# Patient Record
Sex: MALE | Race: BLACK OR AFRICAN AMERICAN | Employment: OTHER | URBAN - NONMETROPOLITAN AREA
[De-identification: names, ages, dates, MRNs, and addresses within clinical notes are randomized per-mention and may not be internally consistent; named-entity substitution may affect disease eponyms.]

---

## 2022-05-19 ENCOUNTER — HOSPITAL ENCOUNTER (INPATIENT)
Age: 67
LOS: 2 days | Discharge: HOME | DRG: 440 | End: 2022-05-21
Attending: INTERNAL MEDICINE | Admitting: HOSPITALIST
Payer: MEDICARE

## 2022-05-19 ENCOUNTER — APPOINTMENT (OUTPATIENT)
Dept: ULTRASOUND IMAGING | Age: 67
DRG: 440 | End: 2022-05-19
Attending: INTERNAL MEDICINE
Payer: MEDICARE

## 2022-05-19 PROBLEM — K85.90 ACUTE PANCREATITIS WITHOUT INFECTION OR NECROSIS: Status: ACTIVE | Noted: 2022-05-19

## 2022-05-19 LAB
ALBUMIN SERPL-MCNC: 4.4 G/DL (ref 3.5–5.2)
ALP BLD-CCNC: 81 U/L (ref 40–130)
ALT SERPL-CCNC: 15 U/L (ref 5–41)
ANION GAP SERPL CALCULATED.3IONS-SCNC: 11 MMOL/L (ref 7–19)
ANION GAP SERPL CALCULATED.3IONS-SCNC: 11 MMOL/L (ref 7–19)
AST SERPL-CCNC: 12 U/L (ref 5–40)
BASOPHILS ABSOLUTE: 0 K/UL (ref 0–0.2)
BASOPHILS RELATIVE PERCENT: 0.4 % (ref 0–1)
BILIRUB SERPL-MCNC: 0.3 MG/DL (ref 0.2–1.2)
BUN BLDV-MCNC: 11 MG/DL (ref 8–23)
BUN BLDV-MCNC: 12 MG/DL (ref 8–23)
CALCIUM SERPL-MCNC: 9.7 MG/DL (ref 8.8–10.2)
CALCIUM SERPL-MCNC: 9.8 MG/DL (ref 8.8–10.2)
CHLORIDE BLD-SCNC: 101 MMOL/L (ref 98–111)
CHLORIDE BLD-SCNC: 102 MMOL/L (ref 98–111)
CO2: 26 MMOL/L (ref 22–29)
CO2: 26 MMOL/L (ref 22–29)
CREAT SERPL-MCNC: 1 MG/DL (ref 0.5–1.2)
CREAT SERPL-MCNC: 1 MG/DL (ref 0.5–1.2)
EOSINOPHILS ABSOLUTE: 0.1 K/UL (ref 0–0.6)
EOSINOPHILS RELATIVE PERCENT: 0.7 % (ref 0–5)
GFR AFRICAN AMERICAN: >59
GFR AFRICAN AMERICAN: >59
GFR NON-AFRICAN AMERICAN: >60
GFR NON-AFRICAN AMERICAN: >60
GLUCOSE BLD-MCNC: 105 MG/DL (ref 74–109)
GLUCOSE BLD-MCNC: 107 MG/DL (ref 74–109)
HCT VFR BLD CALC: 46.7 % (ref 42–52)
HEMOGLOBIN: 15 G/DL (ref 14–18)
IMMATURE GRANULOCYTES #: 0 K/UL
LACTIC ACID: 1.5 MMOL/L (ref 0.5–1.9)
LIPASE: 136 U/L (ref 13–60)
LYMPHOCYTES ABSOLUTE: 2 K/UL (ref 1.1–4.5)
LYMPHOCYTES RELATIVE PERCENT: 24.7 % (ref 20–40)
MCH RBC QN AUTO: 31.7 PG (ref 27–31)
MCHC RBC AUTO-ENTMCNC: 32.1 G/DL (ref 33–37)
MCV RBC AUTO: 98.7 FL (ref 80–94)
MONOCYTES ABSOLUTE: 0.9 K/UL (ref 0–0.9)
MONOCYTES RELATIVE PERCENT: 10.8 % (ref 0–10)
NEUTROPHILS ABSOLUTE: 5.1 K/UL (ref 1.5–7.5)
NEUTROPHILS RELATIVE PERCENT: 63.2 % (ref 50–65)
PDW BLD-RTO: 12.6 % (ref 11.5–14.5)
PLATELET # BLD: 211 K/UL (ref 130–400)
PMV BLD AUTO: 10.8 FL (ref 9.4–12.4)
POTASSIUM REFLEX MAGNESIUM: 4.3 MMOL/L (ref 3.5–5)
POTASSIUM SERPL-SCNC: 4.2 MMOL/L (ref 3.5–5)
RBC # BLD: 4.73 M/UL (ref 4.7–6.1)
SODIUM BLD-SCNC: 138 MMOL/L (ref 136–145)
SODIUM BLD-SCNC: 139 MMOL/L (ref 136–145)
TOTAL PROTEIN: 7.1 G/DL (ref 6.6–8.7)
WBC # BLD: 8.1 K/UL (ref 4.8–10.8)

## 2022-05-19 PROCEDURE — 80053 COMPREHEN METABOLIC PANEL: CPT

## 2022-05-19 PROCEDURE — 85025 COMPLETE CBC W/AUTO DIFF WBC: CPT

## 2022-05-19 PROCEDURE — 83690 ASSAY OF LIPASE: CPT

## 2022-05-19 PROCEDURE — 83605 ASSAY OF LACTIC ACID: CPT

## 2022-05-19 PROCEDURE — 1210000000 HC MED SURG R&B

## 2022-05-19 PROCEDURE — 99221 1ST HOSP IP/OBS SF/LOW 40: CPT | Performed by: SPECIALIST

## 2022-05-19 PROCEDURE — 6370000000 HC RX 637 (ALT 250 FOR IP): Performed by: NURSE PRACTITIONER

## 2022-05-19 PROCEDURE — 2580000003 HC RX 258: Performed by: NURSE PRACTITIONER

## 2022-05-19 PROCEDURE — 6360000002 HC RX W HCPCS: Performed by: NURSE PRACTITIONER

## 2022-05-19 PROCEDURE — 76705 ECHO EXAM OF ABDOMEN: CPT

## 2022-05-19 PROCEDURE — 36415 COLL VENOUS BLD VENIPUNCTURE: CPT

## 2022-05-19 RX ORDER — METOPROLOL SUCCINATE 25 MG/1
25 TABLET, EXTENDED RELEASE ORAL DAILY
Status: DISCONTINUED | OUTPATIENT
Start: 2022-05-19 | End: 2022-05-21 | Stop reason: HOSPADM

## 2022-05-19 RX ORDER — ONDANSETRON 4 MG/1
4 TABLET, ORALLY DISINTEGRATING ORAL EVERY 8 HOURS PRN
Status: DISCONTINUED | OUTPATIENT
Start: 2022-05-19 | End: 2022-05-21 | Stop reason: HOSPADM

## 2022-05-19 RX ORDER — MORPHINE SULFATE 4 MG/ML
4 INJECTION, SOLUTION INTRAMUSCULAR; INTRAVENOUS
Status: DISCONTINUED | OUTPATIENT
Start: 2022-05-19 | End: 2022-05-21 | Stop reason: HOSPADM

## 2022-05-19 RX ORDER — BUPROPION HYDROCHLORIDE 150 MG/1
150 TABLET ORAL EVERY MORNING
COMMUNITY

## 2022-05-19 RX ORDER — SODIUM CHLORIDE 0.9 % (FLUSH) 0.9 %
5-40 SYRINGE (ML) INJECTION EVERY 12 HOURS SCHEDULED
Status: DISCONTINUED | OUTPATIENT
Start: 2022-05-19 | End: 2022-05-21 | Stop reason: HOSPADM

## 2022-05-19 RX ORDER — ROSUVASTATIN CALCIUM 10 MG/1
10 TABLET, COATED ORAL DAILY
Status: DISCONTINUED | OUTPATIENT
Start: 2022-05-19 | End: 2022-05-21 | Stop reason: HOSPADM

## 2022-05-19 RX ORDER — M-VIT,TX,IRON,MINS/CALC/FOLIC 27MG-0.4MG
1 TABLET ORAL DAILY
Status: DISCONTINUED | OUTPATIENT
Start: 2022-05-19 | End: 2022-05-21 | Stop reason: HOSPADM

## 2022-05-19 RX ORDER — BUPROPION HYDROCHLORIDE 150 MG/1
150 TABLET ORAL EVERY MORNING
Status: DISCONTINUED | OUTPATIENT
Start: 2022-05-20 | End: 2022-05-21 | Stop reason: HOSPADM

## 2022-05-19 RX ORDER — SIMVASTATIN 80 MG
80 TABLET ORAL NIGHTLY
COMMUNITY

## 2022-05-19 RX ORDER — ZOLPIDEM TARTRATE 10 MG/1
12.5 TABLET ORAL NIGHTLY PRN
COMMUNITY

## 2022-05-19 RX ORDER — HYDROCODONE BITARTRATE AND ACETAMINOPHEN 10; 325 MG/1; MG/1
1 TABLET ORAL EVERY 6 HOURS PRN
COMMUNITY

## 2022-05-19 RX ORDER — OMEPRAZOLE 20 MG/1
20 CAPSULE, DELAYED RELEASE ORAL DAILY
Status: ON HOLD | COMMUNITY
End: 2022-05-21 | Stop reason: HOSPADM

## 2022-05-19 RX ORDER — LISINOPRIL AND HYDROCHLOROTHIAZIDE 12.5; 1 MG/1; MG/1
1 TABLET ORAL DAILY
COMMUNITY

## 2022-05-19 RX ORDER — M-VIT,TX,IRON,MINS/CALC/FOLIC 27MG-0.4MG
1 TABLET ORAL DAILY
COMMUNITY

## 2022-05-19 RX ORDER — PANTOPRAZOLE SODIUM 40 MG/1
40 TABLET, DELAYED RELEASE ORAL DAILY
COMMUNITY

## 2022-05-19 RX ORDER — SODIUM CHLORIDE 9 MG/ML
INJECTION, SOLUTION INTRAVENOUS PRN
Status: DISCONTINUED | OUTPATIENT
Start: 2022-05-19 | End: 2022-05-21 | Stop reason: HOSPADM

## 2022-05-19 RX ORDER — SODIUM CHLORIDE 9 MG/ML
INJECTION, SOLUTION INTRAVENOUS CONTINUOUS
Status: DISCONTINUED | OUTPATIENT
Start: 2022-05-19 | End: 2022-05-21 | Stop reason: HOSPADM

## 2022-05-19 RX ORDER — SUCRALFATE 1 G/1
1 TABLET ORAL 4 TIMES DAILY
COMMUNITY

## 2022-05-19 RX ORDER — MORPHINE SULFATE 2 MG/ML
2 INJECTION, SOLUTION INTRAMUSCULAR; INTRAVENOUS
Status: DISCONTINUED | OUTPATIENT
Start: 2022-05-19 | End: 2022-05-21 | Stop reason: HOSPADM

## 2022-05-19 RX ORDER — ASPIRIN 81 MG/1
81 TABLET ORAL DAILY
Status: DISCONTINUED | OUTPATIENT
Start: 2022-05-19 | End: 2022-05-21 | Stop reason: HOSPADM

## 2022-05-19 RX ORDER — METOPROLOL SUCCINATE 25 MG/1
25 TABLET, EXTENDED RELEASE ORAL DAILY
COMMUNITY

## 2022-05-19 RX ORDER — HYDROCODONE BITARTRATE AND ACETAMINOPHEN 10; 325 MG/1; MG/1
1 TABLET ORAL EVERY 6 HOURS PRN
Status: DISCONTINUED | OUTPATIENT
Start: 2022-05-19 | End: 2022-05-21 | Stop reason: HOSPADM

## 2022-05-19 RX ORDER — ONDANSETRON 2 MG/ML
4 INJECTION INTRAMUSCULAR; INTRAVENOUS EVERY 6 HOURS PRN
Status: DISCONTINUED | OUTPATIENT
Start: 2022-05-19 | End: 2022-05-21 | Stop reason: HOSPADM

## 2022-05-19 RX ORDER — LORAZEPAM 0.5 MG/1
0.5 TABLET ORAL NIGHTLY
Status: DISCONTINUED | OUTPATIENT
Start: 2022-05-19 | End: 2022-05-21 | Stop reason: HOSPADM

## 2022-05-19 RX ORDER — SODIUM CHLORIDE 0.9 % (FLUSH) 0.9 %
5-40 SYRINGE (ML) INJECTION PRN
Status: DISCONTINUED | OUTPATIENT
Start: 2022-05-19 | End: 2022-05-21 | Stop reason: HOSPADM

## 2022-05-19 RX ORDER — SILDENAFIL 100 MG/1
100 TABLET, FILM COATED ORAL PRN
COMMUNITY

## 2022-05-19 RX ORDER — PANTOPRAZOLE SODIUM 40 MG/1
40 TABLET, DELAYED RELEASE ORAL
Status: DISCONTINUED | OUTPATIENT
Start: 2022-05-20 | End: 2022-05-21 | Stop reason: HOSPADM

## 2022-05-19 RX ORDER — ENOXAPARIN SODIUM 100 MG/ML
40 INJECTION SUBCUTANEOUS DAILY
Status: DISCONTINUED | OUTPATIENT
Start: 2022-05-19 | End: 2022-05-21 | Stop reason: HOSPADM

## 2022-05-19 RX ADMIN — MORPHINE SULFATE 4 MG: 4 INJECTION, SOLUTION INTRAMUSCULAR; INTRAVENOUS at 16:40

## 2022-05-19 RX ADMIN — ENOXAPARIN SODIUM 40 MG: 100 INJECTION SUBCUTANEOUS at 10:58

## 2022-05-19 RX ADMIN — MORPHINE SULFATE 2 MG: 2 INJECTION, SOLUTION INTRAMUSCULAR; INTRAVENOUS at 10:58

## 2022-05-19 RX ADMIN — ROSUVASTATIN CALCIUM 10 MG: 10 TABLET, FILM COATED ORAL at 17:57

## 2022-05-19 RX ADMIN — MORPHINE SULFATE 4 MG: 4 INJECTION, SOLUTION INTRAMUSCULAR; INTRAVENOUS at 14:20

## 2022-05-19 RX ADMIN — SODIUM CHLORIDE: 9 INJECTION, SOLUTION INTRAVENOUS at 17:59

## 2022-05-19 RX ADMIN — MULTIPLE VITAMINS W/ MINERALS TAB 1 TABLET: TAB at 17:57

## 2022-05-19 RX ADMIN — LORAZEPAM 0.5 MG: 0.5 TABLET ORAL at 21:26

## 2022-05-19 RX ADMIN — ASPIRIN 81 MG: 81 TABLET, COATED ORAL at 17:56

## 2022-05-19 RX ADMIN — HYDROCODONE BITARTRATE AND ACETAMINOPHEN 1 TABLET: 10; 325 TABLET ORAL at 19:41

## 2022-05-19 RX ADMIN — METOPROLOL SUCCINATE 25 MG: 25 TABLET, EXTENDED RELEASE ORAL at 17:56

## 2022-05-19 RX ADMIN — SODIUM CHLORIDE: 9 INJECTION, SOLUTION INTRAVENOUS at 11:00

## 2022-05-19 ASSESSMENT — PAIN SCALES - GENERAL
PAINLEVEL_OUTOF10: 9
PAINLEVEL_OUTOF10: 9
PAINLEVEL_OUTOF10: 8
PAINLEVEL_OUTOF10: 9
PAINLEVEL_OUTOF10: 6

## 2022-05-19 ASSESSMENT — ENCOUNTER SYMPTOMS
BLOOD IN STOOL: 0
ABDOMINAL DISTENTION: 1
WHEEZING: 0
CONSTIPATION: 0
VOMITING: 0
ABDOMINAL PAIN: 1
COUGH: 0
DIARRHEA: 0
NAUSEA: 1
SHORTNESS OF BREATH: 0
COLOR CHANGE: 0

## 2022-05-19 ASSESSMENT — PAIN DESCRIPTION - FREQUENCY: FREQUENCY: INTERMITTENT

## 2022-05-19 ASSESSMENT — PAIN DESCRIPTION - ONSET: ONSET: AWAKENED FROM SLEEP

## 2022-05-19 ASSESSMENT — PAIN DESCRIPTION - LOCATION
LOCATION: ABDOMEN

## 2022-05-19 ASSESSMENT — PAIN DESCRIPTION - DESCRIPTORS
DESCRIPTORS: SHARP
DESCRIPTORS: ACHING;SHARP
DESCRIPTORS: ACHING;SHARP
DESCRIPTORS: SHARP

## 2022-05-19 ASSESSMENT — PAIN DESCRIPTION - ORIENTATION
ORIENTATION: RIGHT;LEFT;MID
ORIENTATION: MID

## 2022-05-19 ASSESSMENT — PAIN - FUNCTIONAL ASSESSMENT: PAIN_FUNCTIONAL_ASSESSMENT: PREVENTS OR INTERFERES SOME ACTIVE ACTIVITIES AND ADLS

## 2022-05-19 ASSESSMENT — LIFESTYLE VARIABLES
HOW OFTEN DO YOU HAVE A DRINK CONTAINING ALCOHOL: MONTHLY OR LESS
HOW MANY STANDARD DRINKS CONTAINING ALCOHOL DO YOU HAVE ON A TYPICAL DAY: 1 OR 2

## 2022-05-19 ASSESSMENT — PAIN DESCRIPTION - PAIN TYPE: TYPE: ACUTE PAIN

## 2022-05-19 NOTE — PLAN OF CARE
Encourage patient to monitor pain and call for assistance. Pt has been taking prn pain medication every 2-3 hours with pain relief goal reached. Statement Selected

## 2022-05-19 NOTE — CONSULTS
Pt Name: Sherry Ray  MRN: [id number]  [id number]  YOB: 1955  Admit Date: 5/19/2022  9:24 AM  Date of evaluation: 5/19/2022  Primary Care Physician: Asad Metcalf   [id number]       Requesting Provider: Hospitalist service. GI Consult    Reason for consult / Chief complaint: Acute pancreatitis. History:    The patient is a 79 y.o. male. She was transferred from another hospital for acute pancreatitis. I talked with transferring physician Dr. Uriel Hernandez. Apparently no history of alcohol abuse. No history of tobacco.  Patient has history of GERD, hypertension. She was complaining of abdominal pain for 1 week. She had tenderness in the left upper quadrant left lower quadrant. Lipase was apparently 200 range. CT scan of the abdomen showed acute pancreatitis. Ultrasound was not available. Patient creatinine was 1.48. Historians patient and his wife. Reviewed patient's electronic medical records. No previous records are available from the transferring hospital.    Prior to admission medications included aspirin, rosuvastatin and Halcion. Current hospital medications include sodium chloride, Lovenox, morphine, Zofran. The patient was in usual state of health until 2 weeks ago when he developed generalized abdominal pain. No nausea or vomiting. No radiation of the pain to the back. He presented to the [facility] where he had lipase elevation. CAT scan of the abdomen did not show any pancreatic mass. Patient's last alcohol use was 2 months ago. Patient smokes tobacco.  Last used yesterday. No marijuana. No family history of pancreatic cancer. No steatorrhea. No known history of gallstones. Patient last colonoscopy was 1 to 2 years ago. Apparently were negative. Never had EGD.    5/19/2022: Electrolytes normal.  GFR more than 60. BUN 12, creatinine 1. Glucose 107. Calcium 9.8. Total protein 7.1, albumin 4.4, alkaline phosphatase 81, ALT 15, AST 12, bilirubin 0.3. Lipase 136. Normal lipase 60. WBC count 8.1, hemoglobin 15, MCV 98, platelet count 752,103. Last EGD: Never had EGD. Last Colonoscopy: Has had more than 1 colonoscopies. Last colonoscopy 1-2 years ago. Colonoscopy report not available. Apparently colonoscopy was negative. Medical history: Chest pain, hyperlipidemia, tobacco abuse. Surgical history: Knee arthroplasty. Family history: Breast cancer in mother. Patient denies family history of cancers of esophagus, stomach, small intestine, colon, liver, gallbladder or pancreas. Past Medical History:         Diagnosis Date    Chest pain 11/21/2011    Hyperlipidemia     Tobacco abuse 11/21/2011       Past Surgical History:          Procedure Laterality Date    KNEE ARTHROSCOPY         Allergies:  Terazosin    Home Meds:    Prior to Admission medications    Medication Sig Start Date End Date Taking? Authorizing Provider   aspirin 81 MG EC tablet Take 81 mg by mouth daily. Patient not taking: Reported on 5/19/2022    Historical Provider, MD   rosuvastatin (CRESTOR) 10 MG tablet Take 10 mg by mouth daily. Patient not taking: Reported on 5/19/2022    Historical Provider, MD   triazolam (HALCION) 0.25 MG tablet Take 0.25 mg by mouth nightly as needed.     Patient not taking: Reported on 5/19/2022    Historical Provider, MD        Current Meds:         sodium chloride flush  5-40 mL IntraVENous 2 times per day    enoxaparin  40 mg SubCUTAneous Daily        sodium chloride      sodium chloride 150 mL/hr at 05/19/22 1100       PRN Meds:    sodium chloride flush, sodium chloride, ondansetron **OR** ondansetron, morphine **OR** morphine    Social History:     Social History     Socioeconomic History    Marital status:      Spouse name: Not on file    Number of children: Not on file    Years of education: Not on file    Highest education level: Not on file   Occupational History    Not on file   Tobacco Use    Smoking status: Current Every Day Smoker    Smokeless tobacco: Not on file   Substance and Sexual Activity    Alcohol use: Not on file    Drug use: Not on file    Sexual activity: Not on file   Other Topics Concern    Not on file   Social History Narrative    Not on file     Social Determinants of Health     Financial Resource Strain:     Difficulty of Paying Living Expenses: Not on file   Food Insecurity:     Worried About Running Out of Food in the Last Year: Not on file    Nishant of Food in the Last Year: Not on file   Transportation Needs:     Lack of Transportation (Medical): Not on file    Lack of Transportation (Non-Medical): Not on file   Physical Activity:     Days of Exercise per Week: Not on file    Minutes of Exercise per Session: Not on file   Stress:     Feeling of Stress : Not on file   Social Connections:     Frequency of Communication with Friends and Family: Not on file    Frequency of Social Gatherings with Friends and Family: Not on file    Attends [employer] Services: Not on file    Active Member of [address] [place] [address] [employer] or Organizations: Not on file    Attends Club or Organization Meetings: Not on file    Marital Status: Not on file   Intimate Partner Violence:     Fear of Current or Ex-Partner: Not on file    Emotionally Abused: Not on file    Physically Abused: Not on file    Sexually Abused: Not on file   Housing Stability:     Unable to Pay for Housing in the Last Year: Not on file    Number of [place] in the Last Year: Not on file    Unstable Housing in the Last Year: Not on file       Family History:     Also see HPI. Family History   Problem Relation Age of Onset    Cancer Unknown         mother and father       ROS: Also see HPI. GENERAL: No fever or chills. NEURO: No headache or seizure. EYES: No diplopia or vision loss. CARDIOVASCULAR: No chest pain or palpitations. RESPIRATORY: No dyspnea or cough. GI: See HPI. : No dysuria or hematuria.    GYN: No discharge or abnormal bleeding. MUSCULOSKELETAL: No new arthralgias or myalgias  DERM: No rash or jaundice. ENDOCRINE: No polyuria or polydipsia. PSYCH: No anxiety or depression. Physical Exam:    VITALS:     Vitals:    05/19/22 1000   SpO2: 96%   Weight: 209 lb (94.8 kg)   Height: 5' 8\" (1.727 m)       General appearance: Alert and oriented. Appears stated age, no acute distress     Head: normal cephalic, atraumatic     Neck: No nuchal rigidity     Abdomen: soft, non-tender; bowel sounds normal; no masses,  no organomegaly. Has not received pain medications. Skin: No palmar erythema    Extremities: extremities normal, atraumatic, no cyanosis or edema    Neurologic: Mental status as above. Labs:     Recent Labs     05/19/22  1055   WBC 8.1   RBC 4.73   HGB 15.0   HCT 46.7   MCV 98.7*   MCH 31.7*   MCHC 32.1*        Recent Labs     05/19/22  1055     138   K 4.2  4.3   ANIONGAP 11  11     101   CO2 26  26   BUN 12  11   CREATININE 1.0  1.0   GLUCOSE 107  105   CALCIUM 9.8  9.7     No results for input(s): MG, PHOS in the last 72 hours. Recent Labs     05/19/22  1055   AST 12   ALT 15   BILITOT 0.3   ALKPHOS 81     HgBA1c:  No components found for: HGBA1C  FLP:  No results found for: TRIG, HDL, LDLCALC, LDLDIRECT, LABVLDL  TSH:  No results found for: TSH  Troponin T: No results for input(s): TROPONINI in the last 72 hours. INR: No results for input(s): INR in the last 72 hours. Recent Labs     05/19/22  1055   LIPASE 136*       Radiology:  None available. Assessment:    Acute pancreatitis. Etiology not clear. Ultrasound the gallbladder was not done. Patient last alcohol 2 months ago. He does smoke which is a risk factor for pancreatitis. No marijuana use. There were no pancreatic mass on CT scan. This would go against pancreatic cancer as a cause of acute pancreatitis. CT scan report is not available at this time.   Patient does not have renal failure or lung failure. Acute renal insufficiency has resolved. Plan:    Ringer's lactate as per hospitalist medicine. Monitor hemoglobin and kidney function. Make sure patient does not develop hemoconcentration. Ultrasound of right upper quadrant. Obtain old medical records including CAT scan of the abdomen report from [facility].    My GI call ends tomorrow. Dr. Elizabet Shafer will be on-call tomorrow night. Dr. Yohana Atkinson, GI will be on emergency call. Thank you for the consultation and allowing me to participate in this patient's care alongside with you!     Cleopatra Hitchcock MD  5/19/2022

## 2022-05-19 NOTE — PROGRESS NOTES
Waste of 2mg Morphine witnessed by Jaime Lemus RN. Chelsea Castro did not ask for second fingerprint. Pharmacy called and ran report. States everything looked ok from report but to place progress note stating who witnessed waste.

## 2022-05-19 NOTE — H&P
[facility] - History & Physical      PCP: Christy Gallo    Date of Admission: 5/19/2022    Date of Service: 5/19/2022    Chief Complaint:  Abdominal pain    History Of Present Illness: The patient is a 79 y.o. male with a PMH of HLD and arthritis who was transferred from OSH due to abdominal pain with a diagnosis of pancreatitis. He states that he has had ongoing abdominal pain for approximately 2 weeks however, however it has become unbearable in the last 2 to 3 days. He does have associated nausea. Denies fever, vomiting, diarrhea, chills or chest pain. Testing at the outside facility showed an elevated lipase of 700s of mild EARLE with a creatinine of 1.48 and CT scanning was consistent with the findings of pancreatitis. Hospitalist were requested for transfer admission for  higher level of care and gastroenterology services. Patient denies previous episodes of pancreatitis. He does not drink alcohol regularly. He does have a history of HLD. No modifying factors or worsening factors at this time. Does have associated symptoms to include bloating. Past Medical History:        Diagnosis Date    Chest pain 11/21/2011    Hyperlipidemia     Tobacco abuse 11/21/2011       Past Surgical History:        Procedure Laterality Date    KNEE ARTHROSCOPY         Home Medications:  Prior to Admission medications    Medication Sig Start Date End Date Taking? Authorizing Provider   buPROPion (WELLBUTRIN XL) 150 MG extended release tablet Take 150 mg by mouth every morning   Yes Historical Provider, MD   HYDROcodone-acetaminophen (NORCO)  MG per tablet Take 1 tablet by mouth every 6 hours as needed for Pain.    Yes Historical Provider, MD   metoprolol succinate (TOPROL XL) 25 MG extended release tablet Take 25 mg by mouth daily   Yes Historical Provider, MD   omeprazole (PRILOSEC) 20 MG delayed release capsule Take 20 mg by mouth daily   Yes Historical Provider, MD   pantoprazole (PROTONIX) 40 MG tablet Take 40 mg by mouth daily   Yes Historical Provider, MD   Multiple Vitamins-Minerals (THERAPEUTIC MULTIVITAMIN-MINERALS) tablet Take 1 tablet by mouth daily   Yes Historical Provider, MD   simvastatin (ZOCOR) 80 MG tablet Take 80 mg by mouth nightly   Yes Historical Provider, MD   sucralfate (CARAFATE) 1 GM tablet Take 1 g by mouth 4 times daily   Yes Historical Provider, MD   zolpidem (AMBIEN) 10 MG tablet Take 12.5 mg by mouth nightly as needed for Sleep. Yes Historical Provider, MD   lisinopril-hydroCHLOROthiazide (ZESTORETIC) 10-12.5 MG per tablet Take 1 tablet by mouth daily   Yes Historical Provider, MD   sildenafil (VIAGRA) 100 MG tablet Take 100 mg by mouth as needed for Erectile Dysfunction   Yes Historical Provider, MD   aspirin 81 MG EC tablet Take 81 mg by mouth daily. Patient not taking: Reported on 5/19/2022    Historical Provider, MD   rosuvastatin (CRESTOR) 10 MG tablet Take 10 mg by mouth daily. Patient not taking: Reported on 5/19/2022    Historical Provider, MD   triazolam (HALCION) 0.25 MG tablet Take 0.25 mg by mouth nightly as needed. Patient not taking: Reported on 5/19/2022    Historical Provider, MD       Allergies:    Terazosin    Social History:    The patient currently lives at home with spouse  Tobacco:   reports that he has been smoking. He does not have any smokeless tobacco history on file. Alcohol:   has no history on file for alcohol use. Illicit Drugs: Denies    Family History:      Problem Relation Age of Onset    Cancer Unknown         mother and father       Review of Systems:   Review of Systems   Constitutional: Negative for chills, diaphoresis, fatigue and fever. HENT: Negative for congestion and ear pain. Eyes: Negative for visual disturbance. Respiratory: Negative for cough, shortness of breath and wheezing. Cardiovascular: Negative for chest pain, palpitations and leg swelling.    Gastrointestinal: Positive for abdominal distention, abdominal pain and nausea. Negative for blood in stool, constipation, diarrhea and vomiting. Endocrine: Negative for cold intolerance and heat intolerance. Genitourinary: Negative for difficulty urinating, flank pain, frequency and urgency. Musculoskeletal: Negative for arthralgias and myalgias. Skin: Negative for color change and wound. Neurological: Negative for dizziness, syncope, weakness, light-headedness, numbness and headaches. Hematological: Does not bruise/bleed easily. Psychiatric/Behavioral: Negative for agitation, confusion and dysphoric mood. 14 point review of systems is negative except as specifically addressed above. Physical Examination:  Resp 20   Ht 5' 8\" (1.727 m) Comment: 5'8\"  Wt 209 lb (94.8 kg)   SpO2 96%   BMI 31.78 kg/m²   Physical Exam  Vitals and nursing note reviewed. Constitutional:       General: He is not in acute distress. Appearance: Normal appearance. He is not ill-appearing. Comments: Uncomfortable appearing   HENT:      Head: Normocephalic and atraumatic. Right Ear: External ear normal.      Left Ear: External ear normal.      Nose: Nose normal.      Mouth/Throat:      Mouth: Mucous membranes are moist.   Eyes:      Extraocular Movements: Extraocular movements intact. Conjunctiva/sclera: Conjunctivae normal.      Pupils: Pupils are equal, round, and reactive to light. Cardiovascular:      Rate and Rhythm: Normal rate and regular rhythm. Pulses: Normal pulses. Heart sounds: Normal heart sounds. Pulmonary:      Effort: Pulmonary effort is normal. No respiratory distress. Breath sounds: Normal breath sounds. No wheezing, rhonchi or rales. Abdominal:      General: Bowel sounds are normal. There is distension. Palpations: Abdomen is soft. Tenderness: There is abdominal tenderness (TTP RUQ and periumbilical area). Musculoskeletal:         General: No swelling, tenderness or deformity.  Normal range of motion. Cervical back: Normal range of motion and neck supple. No muscular tenderness. Right lower leg: No edema. Left lower leg: No edema. Skin:     General: Skin is warm and dry. Findings: No bruising or lesion. Neurological:      Mental Status: He is alert and oriented to person, place, and time. Psychiatric:         Mood and Affect: Mood normal.         Behavior: Behavior normal.         Thought Content: Thought content normal.          Diagnostic Data:  CBC:  Recent Labs     05/19/22  1055   WBC 8.1   HGB 15.0   HCT 46.7        BMP:  Recent Labs     05/19/22  1055     138   K 4.2  4.3     101   CO2 26  26   BUN 12  11   CREATININE 1.0  1.0   CALCIUM 9.8  9.7     Recent Labs     05/19/22  1055   AST 12   ALT 15   BILITOT 0.3   ALKPHOS 81       US GALLBLADDER RUQ    Result Date: 5/19/2022  US GALLBLADDER RUQ 5/19/2022 4:11 PM History: Acute pancreatitis. Grayscale and color flow ultrasound evaluation of the gallbladder and right upper quadrant. Fatty liver. No shadowing gallstones or gallbladder wall thickening. No biliary dilation. CBD = 7 mm. Pancreas obscured by bowel gas and not visualized. Normal right kidney measuring 110 x 38 x 51 mm. No free fluid is seen at the upper right abdomen. 1. Fatty liver. 2. No gallstones or biliary dilation. Signed by Dr Ban Nogueira      Assessment/Plan:  Principal Problem:    Acute pancreatitis without infection or necrosis   -Admit to med/surg-Full code   -consult GI    -Morphine IV prn   -Zofran prn N/V   -SCD's for VTE prophylaxis   -Vitals per unit routine   -Strict I&O   -Up as tolerated   -NS at 100ml/hr   -Labs in the AM   -Continue to monitor and manage with supportive medical care    Active Problems:    Hyperlipidemia   -TGR level in the AM   -continue home statin therapy    Resolved Problems:    * No resolved hospital problems.  *       Signed:  AVELINA Hernandez, 5/19/2022 5:01 PM

## 2022-05-20 ENCOUNTER — APPOINTMENT (OUTPATIENT)
Dept: MRI IMAGING | Age: 67
DRG: 440 | End: 2022-05-20
Attending: INTERNAL MEDICINE
Payer: MEDICARE

## 2022-05-20 LAB
ALBUMIN SERPL-MCNC: 4 G/DL (ref 3.5–5.2)
ALP BLD-CCNC: 76 U/L (ref 40–130)
ALT SERPL-CCNC: 12 U/L (ref 5–41)
ANION GAP SERPL CALCULATED.3IONS-SCNC: 11 MMOL/L (ref 7–19)
AST SERPL-CCNC: 11 U/L (ref 5–40)
BASOPHILS ABSOLUTE: 0 K/UL (ref 0–0.2)
BASOPHILS RELATIVE PERCENT: 0.3 % (ref 0–1)
BILIRUB SERPL-MCNC: 0.4 MG/DL (ref 0.2–1.2)
BUN BLDV-MCNC: 10 MG/DL (ref 8–23)
CALCIUM SERPL-MCNC: 9.5 MG/DL (ref 8.8–10.2)
CHLORIDE BLD-SCNC: 102 MMOL/L (ref 98–111)
CO2: 26 MMOL/L (ref 22–29)
CREAT SERPL-MCNC: 1 MG/DL (ref 0.5–1.2)
EOSINOPHILS ABSOLUTE: 0.1 K/UL (ref 0–0.6)
EOSINOPHILS RELATIVE PERCENT: 0.8 % (ref 0–5)
GFR AFRICAN AMERICAN: >59
GFR NON-AFRICAN AMERICAN: >60
GLUCOSE BLD-MCNC: 90 MG/DL (ref 74–109)
HCT VFR BLD CALC: 44.9 % (ref 42–52)
HEMOGLOBIN: 14.7 G/DL (ref 14–18)
IMMATURE GRANULOCYTES #: 0 K/UL
INR BLD: 1.09 (ref 0.88–1.18)
LIPASE: 146 U/L (ref 13–60)
LYMPHOCYTES ABSOLUTE: 1.7 K/UL (ref 1.1–4.5)
LYMPHOCYTES RELATIVE PERCENT: 23 % (ref 20–40)
MCH RBC QN AUTO: 32.6 PG (ref 27–31)
MCHC RBC AUTO-ENTMCNC: 32.7 G/DL (ref 33–37)
MCV RBC AUTO: 99.6 FL (ref 80–94)
MONOCYTES ABSOLUTE: 0.9 K/UL (ref 0–0.9)
MONOCYTES RELATIVE PERCENT: 12.3 % (ref 0–10)
NEUTROPHILS ABSOLUTE: 4.7 K/UL (ref 1.5–7.5)
NEUTROPHILS RELATIVE PERCENT: 63.3 % (ref 50–65)
PDW BLD-RTO: 12.5 % (ref 11.5–14.5)
PLATELET # BLD: 196 K/UL (ref 130–400)
PMV BLD AUTO: 10.6 FL (ref 9.4–12.4)
POTASSIUM SERPL-SCNC: 4.4 MMOL/L (ref 3.5–5)
PROTHROMBIN TIME: 14.1 SEC (ref 12–14.6)
RBC # BLD: 4.51 M/UL (ref 4.7–6.1)
SODIUM BLD-SCNC: 139 MMOL/L (ref 136–145)
TOTAL PROTEIN: 7.2 G/DL (ref 6.6–8.7)
TRIGL SERPL-MCNC: 171 MG/DL (ref 0–149)
WBC # BLD: 7.5 K/UL (ref 4.8–10.8)

## 2022-05-20 PROCEDURE — 6370000000 HC RX 637 (ALT 250 FOR IP): Performed by: SPECIALIST

## 2022-05-20 PROCEDURE — A9577 INJ MULTIHANCE: HCPCS | Performed by: NURSE PRACTITIONER

## 2022-05-20 PROCEDURE — 85025 COMPLETE CBC W/AUTO DIFF WBC: CPT

## 2022-05-20 PROCEDURE — 36415 COLL VENOUS BLD VENIPUNCTURE: CPT

## 2022-05-20 PROCEDURE — 84478 ASSAY OF TRIGLYCERIDES: CPT

## 2022-05-20 PROCEDURE — 85610 PROTHROMBIN TIME: CPT

## 2022-05-20 PROCEDURE — 6370000000 HC RX 637 (ALT 250 FOR IP): Performed by: NURSE PRACTITIONER

## 2022-05-20 PROCEDURE — 99232 SBSQ HOSP IP/OBS MODERATE 35: CPT | Performed by: SPECIALIST

## 2022-05-20 PROCEDURE — 6360000002 HC RX W HCPCS: Performed by: NURSE PRACTITIONER

## 2022-05-20 PROCEDURE — 6360000004 HC RX CONTRAST MEDICATION: Performed by: NURSE PRACTITIONER

## 2022-05-20 PROCEDURE — 1210000000 HC MED SURG R&B

## 2022-05-20 PROCEDURE — 74183 MRI ABD W/O CNTR FLWD CNTR: CPT

## 2022-05-20 PROCEDURE — 2580000003 HC RX 258: Performed by: NURSE PRACTITIONER

## 2022-05-20 PROCEDURE — 80053 COMPREHEN METABOLIC PANEL: CPT

## 2022-05-20 PROCEDURE — 83690 ASSAY OF LIPASE: CPT

## 2022-05-20 RX ADMIN — MULTIPLE VITAMINS W/ MINERALS TAB 1 TABLET: TAB at 08:01

## 2022-05-20 RX ADMIN — METOPROLOL SUCCINATE 25 MG: 25 TABLET, EXTENDED RELEASE ORAL at 08:01

## 2022-05-20 RX ADMIN — ENOXAPARIN SODIUM 40 MG: 100 INJECTION SUBCUTANEOUS at 13:13

## 2022-05-20 RX ADMIN — HYDROCODONE BITARTRATE AND ACETAMINOPHEN 1 TABLET: 10; 325 TABLET ORAL at 15:46

## 2022-05-20 RX ADMIN — ASPIRIN 81 MG: 81 TABLET, COATED ORAL at 08:01

## 2022-05-20 RX ADMIN — HYDROCODONE BITARTRATE AND ACETAMINOPHEN 1 TABLET: 10; 325 TABLET ORAL at 08:01

## 2022-05-20 RX ADMIN — HYDROCODONE BITARTRATE AND ACETAMINOPHEN 1 TABLET: 10; 325 TABLET ORAL at 02:19

## 2022-05-20 RX ADMIN — LORAZEPAM 0.5 MG: 0.5 TABLET ORAL at 20:20

## 2022-05-20 RX ADMIN — MAGNESIUM CITRATE 148 ML: 1.75 LIQUID ORAL at 15:46

## 2022-05-20 RX ADMIN — ROSUVASTATIN CALCIUM 10 MG: 10 TABLET, FILM COATED ORAL at 08:01

## 2022-05-20 RX ADMIN — PANTOPRAZOLE SODIUM 40 MG: 40 TABLET, DELAYED RELEASE ORAL at 06:10

## 2022-05-20 RX ADMIN — SODIUM CHLORIDE, PRESERVATIVE FREE 10 ML: 5 INJECTION INTRAVENOUS at 20:24

## 2022-05-20 RX ADMIN — BUPROPION HYDROCHLORIDE 150 MG: 150 TABLET, EXTENDED RELEASE ORAL at 08:01

## 2022-05-20 RX ADMIN — GADOBENATE DIMEGLUMINE 20 ML: 529 INJECTION, SOLUTION INTRAVENOUS at 15:18

## 2022-05-20 RX ADMIN — HYDROCODONE BITARTRATE AND ACETAMINOPHEN 1 TABLET: 10; 325 TABLET ORAL at 21:20

## 2022-05-20 ASSESSMENT — PAIN DESCRIPTION - LOCATION
LOCATION: ABDOMEN
LOCATION: ABDOMEN;BACK
LOCATION: BACK;ABDOMEN

## 2022-05-20 ASSESSMENT — PAIN SCALES - GENERAL
PAINLEVEL_OUTOF10: 6
PAINLEVEL_OUTOF10: 8
PAINLEVEL_OUTOF10: 9
PAINLEVEL_OUTOF10: 5

## 2022-05-20 ASSESSMENT — ENCOUNTER SYMPTOMS
NAUSEA: 0
ABDOMINAL PAIN: 1
COUGH: 0
COLOR CHANGE: 0
SHORTNESS OF BREATH: 0
WHEEZING: 0
CONSTIPATION: 0
BLOOD IN STOOL: 0
VOMITING: 0
ABDOMINAL DISTENTION: 1
DIARRHEA: 0

## 2022-05-20 ASSESSMENT — PAIN DESCRIPTION - DESCRIPTORS
DESCRIPTORS: DISCOMFORT
DESCRIPTORS: ACHING;SHARP
DESCRIPTORS: SHARP;ACHING

## 2022-05-20 ASSESSMENT — PAIN DESCRIPTION - ORIENTATION
ORIENTATION: RIGHT;LEFT;MID
ORIENTATION: RIGHT;LEFT;MID

## 2022-05-20 NOTE — CARE COORDINATION
Initial CM Assessment    Initial Assessment Completed at bedside with:    [x]   Patient  []   Family/Caregiver/Guardian   []   Other:      Patient Contact Information:  Arsh Vu  [phone] (home)   Above information verified? [x]   Yes  []   No    ADLS:    independent - drives   Support System:   Spouse/Significant Other,Children  Plan to return to current housing:   [x]   Yes  []   No    Transportation plan for Discharge:  Spouse     Do you have any unmet social needs that would keep you from returning home safely:  []   Yes  [x]   No              Unmet Social Needs Notes:       Had [address] prior to admission:    []   Yes  [x]   No    Currently ACTIVE with Home Health CARE:    []   Yes  [x]   No  []   Interested at discharge  [facility]:      Current PCP:  Sol Ball  PCP verified? [x]   Yes  []   No    Have you been vaccinated for COVID-19 (SARS-CoV-2)? [x]   Yes  []   No                   If so, when? Which :  []   [facility]  []   [facility]  []   [address]  []   Other:       Pharmacy:  No Pharmacies [facility] in [place], [address] to use Meds to Bed? []   Yes  [x]   No  Potential assistance purchasing medications?      []   Yes  [x]   No    Active with HD/PD prior to admission:           []   Yes  [x]   No  HD Center:       Financial:  Payor: Alysa Hicks / Plan: BCBS [employer] [facility] LOCAL / Product Type: *No Product type* /     Pre-Cert required for SNF:   [x]   Yes  []   No    Patient Deficits:  []   Yes   [x]   No    If yes:  []   Confusion/Memory  []   Visual  []   Motor/Sensory         []   Right arm         []   Right leg         []   Left arm         []   Left leg  []   Language/Speech         []   Aphasia         []   Dysarthria         []   Swallow         [place] Coma Scale  Eye Opening: Spontaneous  Best Verbal Response: Oriented  Best Motor Response: Obeys commands  Yehuda Coma Scale Score: 15    Patient Deficit Notes: Additional CM/SW Notes:   Pt states that he is feeling much better this morning. He plans to return home with his spouse at [place], he denies any needs at this time.      Rosendo Recio and/or his family were provided with choice of provider:  [x]   Yes   []   No      [address]

## 2022-05-20 NOTE — PROGRESS NOTES
GI  - PROGRESS NOTE    Subjective:   Admit Date: 5/19/2022  PCP: Swetha Gonzalez    Patient being seen for: Pancreatitis. 24hr events/Interval History: Patient's wife at bedside. Patient denies abdominal pain, nausea, vomiting. He is hungry. He is constipated and wants something done for constipation. Patient has a gastroenterologist in [facility] and has had a colonoscopy in the past.  I advised patient to follow-up with his gastroenterologist after discharge. Reviewed CT scan of the abdomen report from 5/19/2022 from [facility]. CT scan was read by Dr. Jocelyn Car. Per report impression was acute interstitial pancreatitis involving the pancreatic head. There were diverticulosis without evidence of diverticulitis. There was vascular calcification and lumbar spondylosis. Medications:    Scheduled Meds:   sodium chloride flush  5-40 mL IntraVENous 2 times per day    enoxaparin  40 mg SubCUTAneous Daily    aspirin  81 mg Oral Daily    buPROPion  150 mg Oral QAM    LORazepam  0.5 mg Oral Nightly    rosuvastatin  10 mg Oral Daily    pantoprazole  40 mg Oral QAM AC    metoprolol succinate  25 mg Oral Daily    therapeutic multivitamin-minerals  1 tablet Oral Daily       Continuous Infusions:   sodium chloride      sodium chloride 100 mL/hr at 05/19/22 1759       PRN Meds:.sodium chloride flush, sodium chloride, ondansetron **OR** ondansetron, morphine **OR** morphine, HYDROcodone-acetaminophen      Labs:     Recent Labs     05/19/22  1055 05/20/22  0248   WBC 8.1 7.5   RBC 4.73 4.51*   HGB 15.0 14.7   HCT 46.7 44.9   MCV 98.7* 99.6*   MCH 31.7* 32.6*   MCHC 32.1* 32.7*    196       Recent Labs     05/19/22  1055     138   K 4.2  4.3   ANIONGAP 11  11     101   CO2 26  26   BUN 12  11   CREATININE 1.0  1.0   GLUCOSE 107  105   CALCIUM 9.8  9.7       No results for input(s): MG, PHOS in the last 72 hours.     Recent Labs 05/19/22  1055   AST 12   ALT 15   BILITOT 0.3   ALKPHOS 81       HgBA1c:  No components found for: HGBA1C    FLP:    Lab Results   Component Value Date    TRIG 171 05/20/2022       TSH:  No results found for: TSH    Troponin T: No results for input(s): TROPONINI in the last 72 hours. INR:   Recent Labs     05/20/22  0248   INR 1.09       Recent Labs     05/19/22  1055 05/20/22  0248   LIPASE 136* 146*     -----------------------------------------------------------------  RAD: Reviewed CT scan of the abdomen report from 5/19/2022 from [facility]. Physical Exam:     Vitals:    05/19/22 1450 05/19/22 1710 05/20/22 0216 05/20/22 0744   BP:  (!) 142/87 137/82 (!) 144/79   Pulse:  77 76 85   Resp: 20 18 17    Temp:  97.9 °F (36.6 °C) 97.3 °F (36.3 °C) 97 °F (36.1 °C)   TempSrc:  Temporal Temporal Temporal   SpO2:  96% 97% 99%   Weight:       Height:           24HR INTAKE/OUTPUT:      Intake/Output Summary (Last 24 hours) at 5/20/2022 0901  Last data filed at 5/20/2022 0217  Gross per 24 hour   Intake 0 ml   Output --   Net 0 ml       General appearance: Alert and  oriented. In no acute cardiopulmonary distress. Abdomen:  soft, non-tender; bowel sounds normal; no masses,  no organomegaly    Extremities: No leg edema     Neurologic: Alert and oriented     Skin:Good turgor. Impression:       Acute pancreatitis. Etiology not clear. No alcohol abuse. Last alcohol use was 2 months ago. Has history of smoking which certainly can cause pancreatitis. No gallstones. On the right upper quadrant shows no gallstones. No biliary dilation. There is fatty liver. Slightly elevated triglycerides are not likely to have caused acute pancreatitis. No renal failure. LFTs are normal.  No hemoconcentration. Triglycerides 171. CT scan report from [facility]. It shows interstitial pancreatitis in the head of the pancreas. No mass. Status has improved. Constipation.   Patient wants something for the constipation. Will order magnesium citrate. Fatty liver. Plan:     Continue hydration. Suggest MRI of the pancreas and MRCP at some point in time because patient has focal pancreatitis    My GI call ends today at 2 PM.  We will not be able to follow-up on this patient after 2 PM.  Dr. Alina Mcmillan will be on-call for GI. Plan with patient, his wife and Dr. Jenifer Yeung.     Sushila Galindo MD    5/20/2022    9:01 AM

## 2022-05-20 NOTE — PROGRESS NOTES
[facility]      Patient:  Wendie Gill  YOB: 1955  Date of Service: 5/20/2022  MRN: [id number]   Acct: [de-identified]   Primary Care Physician: Christos Ross  Advance Directive: Full Code  Admit Date: 5/19/2022       Hospital Day: 1  Portions of this note have been copied forward, however, changed to reflect the most current clinical status of this patient. CHIEF COMPLAINT Abdominal pain    SUBJECTIVE:  States that he feels much better. Continues to have abdominal discomfort, however, does not feel as severe. No overnight events or issues. Denies nausea. CUMULATIVE HOSPITAL STAY:  The patient is a 79 y.o. male with a PMH of HLD and arthritis who was transferred from OSH on 5/19/2022 due to abdominal pain with a diagnosis of pancreatitis. He stated that he has had ongoing abdominal pain for approximately 2 weeks however, however it had become unbearable in the last 2 to 3 days. Reported associated nausea. Denied fever, vomiting, diarrhea, chills, or chest pain. Testing at the outside facility showed an elevated lipase of 700s of mild EARLE with a creatinine of 1.48 and CT scanning was consistent with the findings of pancreatitis. Hospitalist were requested for transfer admission for  higher level of care and gastroenterology services. The patient denies frequent alcohol use. Triglycerides this morning 171. Lipase 146. Gallbladder ultrasound showed fatty liver infiltration with no gallstones or biliary dilatation. MRCP ordered and pending. Did eat breakfast this a.m. and tolerated it well. Chemistries remained stable. LFTs within normal limits. CBC within normal limits. Vitals remained stable and he is afebrile. Review of Systems:   Review of Systems   Constitutional: Negative for chills, diaphoresis, fatigue and fever. HENT: Negative for congestion and ear pain. Eyes: Negative for visual disturbance. Respiratory: Negative for cough, shortness of breath and wheezing. Cardiovascular: Negative for chest pain, palpitations and leg swelling. Gastrointestinal: Positive for abdominal distention and abdominal pain. Negative for blood in stool, constipation, diarrhea, nausea and vomiting. Endocrine: Negative for cold intolerance and heat intolerance. Genitourinary: Negative for difficulty urinating, flank pain, frequency and urgency. Musculoskeletal: Negative for arthralgias and myalgias. Skin: Negative for color change and wound. Neurological: Negative for dizziness, syncope, weakness, light-headedness, numbness and headaches. Hematological: Does not bruise/bleed easily. Psychiatric/Behavioral: Negative for agitation, confusion and dysphoric mood. 14 point review of systems is negative except as specifically addressed above. Objective:   VITALS:  BP (!) 144/79   Pulse 85   Temp 97 °F (36.1 °C) (Temporal)   Resp 17   Ht 5' 8\" (1.727 m) Comment: 5'8\"  Wt 209 lb (94.8 kg)   SpO2 99%   BMI 31.78 kg/m²   24HR INTAKE/OUTPUT:    Intake/Output Summary (Last 24 hours) at 5/20/2022 1016  Last data filed at 5/20/2022 0217  Gross per 24 hour   Intake 0 ml   Output --   Net 0 ml       Physical Exam  Vitals and nursing note reviewed. Constitutional:       General: He is not in acute distress. Appearance: Normal appearance. He is obese. He is ill-appearing. HENT:      Head: Normocephalic and atraumatic. Right Ear: External ear normal.      Left Ear: External ear normal.      Nose: Nose normal.      Mouth/Throat:      Mouth: Mucous membranes are moist.   Eyes:      Extraocular Movements: Extraocular movements intact. Conjunctiva/sclera: Conjunctivae normal.      Pupils: Pupils are equal, round, and reactive to light. Cardiovascular:      Rate and Rhythm: Normal rate and regular rhythm. Pulses: Normal pulses. Heart sounds: Normal heart sounds. Pulmonary:      Effort: Pulmonary effort is normal. No respiratory distress.       Breath sounds: Normal breath sounds. No wheezing, rhonchi or rales. Abdominal:      General: Bowel sounds are normal. There is distension (Improved). Palpations: Abdomen is soft. Tenderness: There is abdominal tenderness (TTP-significantly improved). Musculoskeletal:         General: No swelling, tenderness or deformity. Normal range of motion. Cervical back: Normal range of motion and neck supple. No muscular tenderness. Right lower leg: No edema. Left lower leg: No edema. Skin:     General: Skin is warm and dry. Findings: No bruising or lesion. Neurological:      Mental Status: He is alert and oriented to person, place, and time. Psychiatric:         Mood and Affect: Mood normal.         Behavior: Behavior normal.         Thought Content: Thought content normal.             Medications:      sodium chloride      sodium chloride 100 mL/hr at 05/19/22 1759      magnesium citrate  148 mL Oral Once    sodium chloride flush  5-40 mL IntraVENous 2 times per day    enoxaparin  40 mg SubCUTAneous Daily    aspirin  81 mg Oral Daily    buPROPion  150 mg Oral QAM    LORazepam  0.5 mg Oral Nightly    rosuvastatin  10 mg Oral Daily    pantoprazole  40 mg Oral QAM AC    metoprolol succinate  25 mg Oral Daily    therapeutic multivitamin-minerals  1 tablet Oral Daily     sodium chloride flush, sodium chloride, ondansetron **OR** ondansetron, morphine **OR** morphine, HYDROcodone-acetaminophen  ADULT DIET;  Regular     Lab and other Data:     Recent Labs     05/19/22  1055 05/20/22  0248   WBC 8.1 7.5   HGB 15.0 14.7    196     Recent Labs     05/19/22  1055 05/20/22  0859     138 139   K 4.2  4.3 4.4     101 102   CO2 26  26 26   BUN 12  11 10   CREATININE 1.0  1.0 1.0   GLUCOSE 107  105 90     Recent Labs     05/19/22  1055 05/20/22  0859   AST 12 11   ALT 15 12   BILITOT 0.3 0.4   ALKPHOS 81 76     Troponin T: No results for input(s): TROPONINI in the last 72 hours.  Pro-BNP: No results for input(s): BNP in the last 72 hours. INR:   Recent Labs     05/20/22  0248   INR 1.09       RAD:   US GALLBLADDER RUQ    Result Date: 5/19/2022  1. Fatty liver. 2. No gallstones or biliary dilation. Signed by Dr Georgina Sampson         Assessment/Plan   Principal Problem:    Acute pancreatitis without infection or necrosis   -MRCP today   -AM labs   -Advance diet to regular diet once MRCP complete   -Pain and nausea management    Active Problems:    Hyperlipidemia   -Continue statin therapy    Resolved Problems:    * No resolved hospital problems.  *        DVT Prophylaxis: Lovenox    Disposition: Possibly in AM    AVELINA Barker, 5/20/2022 10:16 AM

## 2022-05-20 NOTE — PLAN OF CARE
Lipase still elevated. No kidney failure. Ultrasound of the gallbladder does not show any stones. Plan    Obtain results of previous CAT scan of abdomen done at another hospital.  If any suspicious lesions on CAT scan of the abdomen suggest MRI of the pancreas and MRCP. My GI call ends today. Dr. Mayco Chatman will be on-call. Dr. Gunner Catherine will be on call for emergency procedures only.

## 2022-05-21 VITALS
HEIGHT: 68 IN | SYSTOLIC BLOOD PRESSURE: 154 MMHG | BODY MASS INDEX: 31.67 KG/M2 | RESPIRATION RATE: 18 BRPM | HEART RATE: 82 BPM | TEMPERATURE: 97.7 F | WEIGHT: 209 LBS | OXYGEN SATURATION: 100 % | DIASTOLIC BLOOD PRESSURE: 84 MMHG

## 2022-05-21 LAB
ANION GAP SERPL CALCULATED.3IONS-SCNC: 9 MMOL/L (ref 7–19)
BASOPHILS ABSOLUTE: 0 K/UL (ref 0–0.2)
BASOPHILS RELATIVE PERCENT: 0.3 % (ref 0–1)
BUN BLDV-MCNC: 9 MG/DL (ref 8–23)
CALCIUM SERPL-MCNC: 9.2 MG/DL (ref 8.8–10.2)
CHLORIDE BLD-SCNC: 103 MMOL/L (ref 98–111)
CO2: 24 MMOL/L (ref 22–29)
CREAT SERPL-MCNC: 0.9 MG/DL (ref 0.5–1.2)
EOSINOPHILS ABSOLUTE: 0 K/UL (ref 0–0.6)
EOSINOPHILS RELATIVE PERCENT: 0.5 % (ref 0–5)
GFR AFRICAN AMERICAN: >59
GFR NON-AFRICAN AMERICAN: >60
GLUCOSE BLD-MCNC: 107 MG/DL (ref 74–109)
HCT VFR BLD CALC: 41.7 % (ref 42–52)
HEMOGLOBIN: 13.3 G/DL (ref 14–18)
IMMATURE GRANULOCYTES #: 0 K/UL
LIPASE: 228 U/L (ref 13–60)
LYMPHOCYTES ABSOLUTE: 1.6 K/UL (ref 1.1–4.5)
LYMPHOCYTES RELATIVE PERCENT: 21 % (ref 20–40)
MCH RBC QN AUTO: 31.7 PG (ref 27–31)
MCHC RBC AUTO-ENTMCNC: 31.9 G/DL (ref 33–37)
MCV RBC AUTO: 99.5 FL (ref 80–94)
MONOCYTES ABSOLUTE: 0.9 K/UL (ref 0–0.9)
MONOCYTES RELATIVE PERCENT: 11.9 % (ref 0–10)
NEUTROPHILS ABSOLUTE: 4.9 K/UL (ref 1.5–7.5)
NEUTROPHILS RELATIVE PERCENT: 66 % (ref 50–65)
PDW BLD-RTO: 12.3 % (ref 11.5–14.5)
PLATELET # BLD: 197 K/UL (ref 130–400)
PMV BLD AUTO: 11 FL (ref 9.4–12.4)
POTASSIUM SERPL-SCNC: 4 MMOL/L (ref 3.5–5)
RBC # BLD: 4.19 M/UL (ref 4.7–6.1)
SODIUM BLD-SCNC: 136 MMOL/L (ref 136–145)
WBC # BLD: 7.5 K/UL (ref 4.8–10.8)

## 2022-05-21 PROCEDURE — 6370000000 HC RX 637 (ALT 250 FOR IP): Performed by: HOSPITALIST

## 2022-05-21 PROCEDURE — 6370000000 HC RX 637 (ALT 250 FOR IP): Performed by: NURSE PRACTITIONER

## 2022-05-21 PROCEDURE — 6360000002 HC RX W HCPCS: Performed by: NURSE PRACTITIONER

## 2022-05-21 PROCEDURE — 36415 COLL VENOUS BLD VENIPUNCTURE: CPT

## 2022-05-21 PROCEDURE — 83690 ASSAY OF LIPASE: CPT

## 2022-05-21 PROCEDURE — 85025 COMPLETE CBC W/AUTO DIFF WBC: CPT

## 2022-05-21 PROCEDURE — 80048 BASIC METABOLIC PNL TOTAL CA: CPT

## 2022-05-21 RX ORDER — LACTULOSE 10 G/15ML
30 SOLUTION ORAL 3 TIMES DAILY
Status: DISCONTINUED | OUTPATIENT
Start: 2022-05-21 | End: 2022-05-21 | Stop reason: HOSPADM

## 2022-05-21 RX ORDER — LACTULOSE 10 G/15ML
30 SOLUTION ORAL 3 TIMES DAILY
Qty: 405 ML | Refills: 0 | Status: SHIPPED | OUTPATIENT
Start: 2022-05-21 | End: 2022-05-24

## 2022-05-21 RX ADMIN — LACTULOSE 30 G: 20 SOLUTION ORAL at 09:41

## 2022-05-21 RX ADMIN — PANTOPRAZOLE SODIUM 40 MG: 40 TABLET, DELAYED RELEASE ORAL at 03:53

## 2022-05-21 RX ADMIN — PANTOPRAZOLE SODIUM 40 MG: 40 TABLET, DELAYED RELEASE ORAL at 08:03

## 2022-05-21 RX ADMIN — MULTIPLE VITAMINS W/ MINERALS TAB 1 TABLET: TAB at 08:02

## 2022-05-21 RX ADMIN — ASPIRIN 81 MG: 81 TABLET, COATED ORAL at 08:01

## 2022-05-21 RX ADMIN — HYDROCODONE BITARTRATE AND ACETAMINOPHEN 1 TABLET: 10; 325 TABLET ORAL at 03:52

## 2022-05-21 RX ADMIN — MORPHINE SULFATE 4 MG: 4 INJECTION, SOLUTION INTRAMUSCULAR; INTRAVENOUS at 00:20

## 2022-05-21 RX ADMIN — ROSUVASTATIN CALCIUM 10 MG: 10 TABLET, FILM COATED ORAL at 08:02

## 2022-05-21 RX ADMIN — BUPROPION HYDROCHLORIDE 150 MG: 150 TABLET, EXTENDED RELEASE ORAL at 08:01

## 2022-05-21 RX ADMIN — METOPROLOL SUCCINATE 25 MG: 25 TABLET, EXTENDED RELEASE ORAL at 08:01

## 2022-05-21 ASSESSMENT — PAIN DESCRIPTION - DESCRIPTORS
DESCRIPTORS: DISCOMFORT
DESCRIPTORS: DISCOMFORT

## 2022-05-21 ASSESSMENT — PAIN SCALES - GENERAL
PAINLEVEL_OUTOF10: 0
PAINLEVEL_OUTOF10: 9
PAINLEVEL_OUTOF10: 8

## 2022-05-21 ASSESSMENT — PAIN DESCRIPTION - LOCATION
LOCATION: BACK;SHOULDER
LOCATION: ABDOMEN;BACK;SHOULDER

## 2022-05-21 ASSESSMENT — PAIN DESCRIPTION - ORIENTATION
ORIENTATION: RIGHT
ORIENTATION: LOWER

## 2022-05-21 NOTE — DISCHARGE SUMMARY
Sandi Huff  :  1955  MRN:  [id number]    Admit date:  2022  Discharge date:  2022    Discharging Physician:  Dr. Marianne Jackson Directive: Full Code    Consults: IP CONSULT TO GI     Primary Care Physician:  Christy Gallo    Discharge Diagnoses:  Principal Problem:    Acute pancreatitis without infection or necrosis  Active Problems:    Hyperlipidemia  Resolved Problems:    * No resolved hospital problems. *      Portions of this note have been copied forward, however, changed to reflect the most current clinical status of this patient. Hospital Course: The patient is a 79 y. o. male with a PMH of HLD and arthritis who was transferred from OSH on 2022 due to abdominal pain with a diagnosis of pancreatitis. Moni Cowan stated that he has had ongoing abdominal pain for approximately 2 weeks however, however it had become unbearable in the last 2 to 3 days.    Reported associated nausea. Denied fever, vomiting, diarrhea, chills, or chest pain. Testing at the outside facility showed an elevated lipase of 700s of mild EARLE with a creatinine of 1.48 and CT scanning was consistent with the findings of pancreatitis.  Hospitalist were requested for transfer admission for  higher level of care and gastroenterology services.     The patient denies frequent alcohol use. US of the gallbladder showed fatty liver infiltration. Triglycerides-171 on 2022. GI recommeneded an MRCP for further evaluation. MRCP shows findings of pancreatitis involving the head of the pancreas with mild prominence of the pancreatic head/uncinate process, likely inflammatory, without discrete pancreatic nodule process. No evidence of biliary dilatation. No evidence of: No choledolithiasis or cholelithiasis. Does continue to have mild pain, however, it is well controlled on pain medication. Most recent lipase level 228. He was given a regular diet and tolerated it without nausea, abdominal pain, vomiting or bloating. His labs and vitals are stable. He is medically and clinically stable for discharge. He will be discharged home in stable condition. He is to follow-up with his PCP within 1 week for hospital follow-up. Significant Diagnostic Studies:   US GALLBLADDER RUQ    Result Date: 5/19/2022  US GALLBLADDER RUQ 5/19/2022 4:11 PM History: Acute pancreatitis. Grayscale and color flow ultrasound evaluation of the gallbladder and right upper quadrant. Fatty liver. No shadowing gallstones or gallbladder wall thickening. No biliary dilation. CBD = 7 mm. Pancreas obscured by bowel gas and not visualized. Normal right kidney measuring 110 x 38 x 51 mm. No free fluid is seen at the upper right abdomen. 1. Fatty liver. 2. No gallstones or biliary dilation. Signed by Dr Saida Garrett    [facility] MRCP    Result Date: 5/20/2022  History: Pancreatitis of unknown origin MRI abdomen: Multiplanar imaging the abdomen performed pre- and post-IV contrast. 2-D and 3-D images are reconstructed of the bile ducts. COMPARISON: CT abdomen pelvis 5/19/2022 FINDINGS: There is no suspicious focal liver or splenic lesion. Liver and spleen appear normal in size. There were. Pancreatic inflammatory changes along the head of the pancreas with minimal stranding along the right anterior pararenal fascia. There is no pseudocyst collection. There is a prominence of the pancreatic head/uncinate process without discrete mass which may be inflammatory in etiology. The gallbladder contains no discrete stones. There is no biliary dilatation. Common bile duct measures 6 mm, normal in a 79year-old patient. There is tapering of the common bile duct distally with no discrete intraluminal filling defects. No pancreatic ductal dilatation identified. There is no adrenal nodule. No suspicious renal mass. No hydronephrosis. There is moderate fecal stasis. The stomach is under distended. There is bilateral gynecomastia. No pleural effusions. Normal breath sounds. No wheezing, rhonchi or rales. Abdominal:      General: Bowel sounds are normal. There is distension (Improved). Palpations: Abdomen is soft. Tenderness: There is no abdominal tenderness. Musculoskeletal:         General: No swelling, tenderness or deformity. Normal range of motion. Cervical back: Normal range of motion and neck supple. No muscular tenderness. Right lower leg: No edema. Left lower leg: No edema. Skin:     General: Skin is warm and dry. Findings: No bruising or lesion. Neurological:      Mental Status: He is alert and oriented to person, place, and time. Psychiatric:         Mood and Affect: Mood normal.         Behavior: Behavior normal.         Thought Content:  Thought content normal.         Discharge Medications:         Medication List      START taking these medications    lactulose 10 GM/15ML solution  Commonly known as: CHRONULAC  Take 45 mLs by mouth 3 times daily for 3 days        CONTINUE taking these medications    aspirin 81 MG EC tablet     buPROPion 150 MG extended release tablet  Commonly known as: WELLBUTRIN XL     HYDROcodone-acetaminophen  MG per tablet  Commonly known as: NORCO     metoprolol succinate 25 MG extended release tablet  Commonly known as: TOPROL XL     pantoprazole 40 MG tablet  Commonly known as: PROTONIX     sildenafil 100 MG tablet  Commonly known as: VIAGRA     simvastatin 80 MG tablet  Commonly known as: ZOCOR     sucralfate 1 GM tablet  Commonly known as: CARAFATE     therapeutic multivitamin-minerals tablet     Zestoretic 10-12.5 MG per tablet  Generic drug: lisinopril-hydroCHLOROthiazide     zolpidem 10 MG tablet  Commonly known as: AMBIEN        STOP taking these medications    omeprazole 20 MG delayed release capsule  Commonly known as: PRILOSEC     rosuvastatin 10 MG tablet  Commonly known as: CRESTOR     triazolam 0.25 MG tablet  Commonly known as: HALCION           Where to Get Your Medications      These medications were sent to [address] [address]    Phone: [phone]   · lactulose 10 GM/15ML solution         Discharge Instructions: Follow up with [facility] call office and make an appointment within 1 week  Take medications as directed. Resume activity as tolerated. Diet: ADULT DIET; Regular     Disposition: Patient is Stableand will be discharged to Home. Time spent on discharge 37 minutes spent in assessing patient, reviewing medications, discussion with nursing, confirming safe discharge plan and preparation of discharge summary.     Signed:  AVELINA Lynn, 5/21/2022 11:15 AM

## 2022-05-21 NOTE — PROGRESS NOTES
22g to R hand removed at this time. Pressure applied to site, bandaid placed at site. Cathlon tip in tact.